# Patient Record
Sex: MALE | Race: WHITE
[De-identification: names, ages, dates, MRNs, and addresses within clinical notes are randomized per-mention and may not be internally consistent; named-entity substitution may affect disease eponyms.]

---

## 2019-04-27 ENCOUNTER — HOSPITAL ENCOUNTER (EMERGENCY)
Dept: HOSPITAL 56 - MW.ED | Age: 54
Discharge: HOME | End: 2019-04-27
Payer: SELF-PAY

## 2019-04-27 DIAGNOSIS — S86.002A: Primary | ICD-10-CM
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
## 2019-04-27 PROCEDURE — 73610 X-RAY EXAM OF ANKLE: CPT

## 2019-04-27 PROCEDURE — 99283 EMERGENCY DEPT VISIT LOW MDM: CPT

## 2019-04-27 NOTE — EDM.PDOC
ED HPI GENERAL MEDICAL PROBLEM





- General


Chief Complaint: Lower Extremity Injury/Pain


Stated Complaint: LT LEG HURTS


Time Seen by Provider: 04/27/19 00:50





- History of Present Illness


INITIAL COMMENTS - FREE TEXT/NARRATIVE: 


HISTORY AND PHYSICAL:





History of present illness:


Patient 53-year-old white male who presents with a concern of left Achilles 

tendon injury that occurred when he hyperextended his foot. He is pain swelling 

to the area he denies any other trauma or concern





Review of systems: 


As per history of present illness and below otherwise all systems reviewed and 

negative.





Past medical history: 


As per history of present illness and as reviewed below otherwise 

noncontributory.





Surgical history: 


As per history of present illness and as reviewed below otherwise 

noncontributory.





Social history: 


No reported history of drug or alcohol abuse.





Family history: 


As per history of present illness and as reviewed below otherwise 

noncontributory.





Physical exam:


HEENT: Atraumatic, normocephalic, pupils reactive, negative for conjunctival 

pallor or scleral icterus, mucous membranes moist, throat clear, neck supple, 

nontender, trachea midline.


Lungs: Clear to auscultation, breath sounds equal bilaterally, chest nontender.


Heart: S1S2, regular, negative for clicks, rubs, or JVD.


Abdomen: Soft, nondistended, nontender. Negative for masses or 

hepatosplenomegaly. Negative for costovertebral tenderness.


Pelvis: Stable nontender.


Genitourinary: Deferred.


Rectal: Deferred.


Extremities: Left Achilles is swollen and tender to the touch without obvious 

evidence of rupture he is able to dorsiflex.


Neuro: Awake, alert, oriented. Cranial nerves II through XII unremarkable. 

Cerebellum unremarkable. Motor and sensory unremarkable throughout. Exam 

nonfocal.





Diagnostics:


Treat left ankle





Therapeutics:


Posterior mold left ankle/crutches hydrocodone 10 mg by mouth





Impression: 


# 1 left Achilles tendon injury





Definitive disposition and diagnosis as appropriate pending reevaluation and 

review of above.








Review of Systems





- Review of Systems


Review Of Systems: ROS reveals no pertinent complaints other than HPI.





ED EXAM, GENERAL





- Physical Exam


Exam: See Below (See dictation)





Departure





- Departure


Time of Disposition: 00:52


Disposition: Home, Self-Care 01


Condition: Good


Clinical Impression: 


 Achilles tendon injury








- Discharge Information


Referrals: 


PCP,None [Primary Care Provider] - 


Additional Instructions: 


The following information is given to patients seen in the emergency department 

who are being discharged to home. This information is to outline your options 

for follow-up care. We provide all patients seen in our emergency department 

with a follow-up referral.





The need for follow-up, as well as the timing and circumstances, are variable 

depending upon the specifics of your emergency department visit.





If you don't have a primary care physician on staff, we will provide you with a 

referral. We always advise you to contact your personal physician following an 

emergency department visit to inform them of the circumstance of the visit and 

for follow-up with them and/or the need for any referrals to a consulting 

specialist.





The emergency department will also refer you to a specialist when appropriate. 

This referral assures that you have the opportunity for followup care with a 

specialist. All of these measure are taken in an effort to provide you with 

optimal care, which includes your followup.





Under all circumstances we always encourage you to contact your private 

physician who remains a resource for coordinating  your care. When calling for 

followup care, please make the office aware that this follow-up is from your 

recent emergency room visit. If for any reason you are refused follow-up, 

please contact the [facility] emergency department at [phone] 

and asked to speak to the emergency department charge nurse.














[facility]


Specialty Care - Orthopedic Clinic


[address]


[address] [place] [address], [address]


[place] [address]


Phone: [phone]


Fax: [phone]











Follow-up orthopedic clinic above posterior mold and crutches as directed 

Ultram as prescribed return as needed as discussed

## 2019-04-29 NOTE — CR
EXAM DATE: 19



PATIENT'S AGE: 53





Patient: JEN VICENTE



Facility: [facility] Patient ID: [id number]

Site Patient ID: [id number].

Site Accession #: [id number].

: 1965

Study: XRay-Extremity Left ankle-2019 1:12:03 AM

Ordering Physician: Juan Jose Tovar

Final Report: 

INDICATION: 3 images. no prior. pain following fall



TECHNIQUE:

Left ankle 3 views. 



COMPARISON:

None. 



FINDINGS:

Bones: Alignment is normal. No fractures or bone lesions. 



Joint spaces: Unremarkable. 



Soft tissues: Unremarkable. 



IMPRESSION:

Unremarkable left ankle.





Dictated by: Rick Vasquez MD @ 2019 01:15:54

Signed by: Rick Vasquez MD @2019 1:15:54 AM

(Electronic Signature)



Report Signed by Proxy.
SHEILA